# Patient Record
Sex: FEMALE | Race: WHITE | NOT HISPANIC OR LATINO | Employment: OTHER | URBAN - METROPOLITAN AREA
[De-identification: names, ages, dates, MRNs, and addresses within clinical notes are randomized per-mention and may not be internally consistent; named-entity substitution may affect disease eponyms.]

---

## 2017-04-24 ENCOUNTER — GENERIC CONVERSION - ENCOUNTER (OUTPATIENT)
Dept: OTHER | Facility: OTHER | Age: 77
End: 2017-04-24

## 2017-05-30 ENCOUNTER — ALLSCRIPTS OFFICE VISIT (OUTPATIENT)
Dept: OTHER | Facility: OTHER | Age: 77
End: 2017-05-30

## 2017-05-30 DIAGNOSIS — Z12.2 ENCOUNTER FOR SCREENING FOR MALIGNANT NEOPLASM OF RESPIRATORY ORGANS: ICD-10-CM

## 2017-05-30 DIAGNOSIS — Z13.820 ENCOUNTER FOR SCREENING FOR OSTEOPOROSIS: ICD-10-CM

## 2017-05-30 DIAGNOSIS — H91.90 HEARING LOSS: ICD-10-CM

## 2017-06-28 ENCOUNTER — ALLSCRIPTS OFFICE VISIT (OUTPATIENT)
Dept: OTHER | Facility: OTHER | Age: 77
End: 2017-06-28

## 2018-01-09 NOTE — PROGRESS NOTES
Assessment    1  Arthritis (716 90) (M19 90)   2  COPD (chronic obstructive pulmonary disease) (496) (J44 9)   3  Encounter for well adult exam with abnormal findings (V70 0) (Z00 01)    Plan   Arthritis    · Ibuprofen 800 MG Oral Tablet; TAKE 1 TABLET 3 TIMES DAILY WITH FOOD AS  NEEDED   · (1) RHEUMATOID FACTOR SCREEN; Status:Active; Requested for:16Mar2016;    · (1) SED RATE; Status:Active; Requested for:16Mar2016;   Encounter for well adult exam with abnormal findings    · (1) BASIC METABOLIC PROFILE; Status:Active; Requested for:16Mar2016;    · (1) LIPID PANEL, FASTING; Status:Active; Requested for:16Mar2016;     Complete PFT; Status:Hold For - Scheduling; Requested for:16Mar2016;   Perform:[facility]; Due:16Mar2017; Last Updated By:Goldie Fuentes; 3/16/2016 12:55:57 PM;Ordered; For:COPD (chronic obstructive pulmonary disease); Ordered By:Goldie Fuentes;      Discussion/Summary  health maintenance visit Currently, she eats an adequate diet  the patient declines cervical cancer screening Breast cancer screening: the patient declines breast cancer screening  Colorectal cancer screening: the patient declines colorectal cancer screening  Screening lab work includes glucose and lipid profile  Advice and education were given regarding tobacco cessation  #Arthritis- ordered RF, has 1 large joint, 3 small joints involved, >6 weeks, gave RX for ibuprofen 800mg, will follow up in 2-3 weeks, if RA will give referral for rheumatology  #COPD- ordered complete PFT to diagnosis COPD, smoking 1ppd x 40 years will start on steroid inhaler depending on PFTs  # health maintenance- ordered lipid panel and basic metabolic profile, patient refuses colonoscopy and mammogram       Chief Complaint  CPE 63 yo pt has some arthritis issues      History of Present Illness  HPI: 64year old female smoker, who is presenting for pain regarding arthritis > 1 year and states pain in hand joints worsening   She tried taking ibuprofen 200mg BID and increased to 400mg BID but has not helped with pain  She is having trouble doing activities of daily living due to having problems with bending fingers  She also has elbow, wrist and knee pain  She states pain is constant  Review of Systems    Constitutional: no fever and no chills  ENT: no earache  Cardiovascular: no chest pain and no palpitations  Respiratory: no shortness of breath and no wheezing  Gastrointestinal: no abdominal pain, no nausea, no vomiting and no diarrhea  Musculoskeletal: arthralgias, but no myalgias  Family History    · No pertinent family history    Social History    · Current every day smoker (305 1) (F17 200)   · 1 ppd x 40 years   · Does not use illicit drugs (I00 16) (Z78 9)   · No alcohol use    Current Meds   1  Ibuprofen 200 MG Oral Capsule; Therapy: (Recorded:16Mar2016) to Recorded    Allergies    1  No Known Drug Allergies    Vitals   Recorded: 73YKV2364 10:58AM Recorded: 16NGG9989 10:54AM   Temperature  96 7 F   Heart Rate  88   Respiration  20   Systolic 924    Diastolic 72    Height  5 ft 2 in   Weight  93 lb    BMI Calculated  17 01   BSA Calculated  1 38   O2 Saturation  91     Physical Exam    Constitutional   General appearance: No acute distress, well appearing and well nourished  Head and Face   Head and face: Normal     Eyes   Conjunctiva and lids: No swelling, erythema or discharge  Pupils and irises: Equal, round, reactive to light  Ears, Nose, Mouth, and Throat   External inspection of ears and nose: Normal     Otoscopic examination: Tympanic membranes translucent with normal light reflex  Canals patent without erythema  Lips, teeth, and gums: Normal, good dentition  Oropharynx: Normal with no erythema, edema, exudate or lesions  Pulmonary   Respiratory effort: No increased work of breathing or signs of respiratory distress  Auscultation of lungs: Clear to auscultation      Cardiovascular   Auscultation of heart: Normal rate and rhythm, normal S1 and S2, no murmurs  Examination of extremities for edema and/or varicosities: Normal     Abdomen   Abdomen: Non-tender, no masses  Liver and spleen: No hepatomegaly or splenomegaly  Musculoskeletal   Gait and station: Normal     Digits and nails: Abnormal   Examination of the extremities revealed swollen PIP, metacarpal joints, pain to palpation of joints, erythema  Joints, bones, and muscles: Abnormal   Appearance - swelling and erythema  Palpation - tenderness and increased warmth, but no crepitus  Muscle strength/tone: Normal     Neurologic   Sensation: No sensory loss  Psychiatric   Orientation to person, place, and time: Normal     Mood and affect: Normal        Attending Note  Attending Note: Attending Note: I discussed the case with the Resident and reviewed the Resident's note and I agree with the Resident management plan as it was presented to me        Future Appointments    Date/Time Provider Specialty Site   03/30/2016 11:00 AM Ike Sotomayor DO [facility]     Signatures   Electronically signed by : Acacia Fitzpatrick DO; Mar 16 2016  1:24PM EST                       (Author)    Electronically signed by : TOMAS Dave Ace ; Mar 17 2016 10:28AM EST                       (Co-author)

## 2018-01-12 VITALS
RESPIRATION RATE: 18 BRPM | TEMPERATURE: 96 F | WEIGHT: 88 LBS | HEIGHT: 62 IN | BODY MASS INDEX: 16.2 KG/M2 | HEART RATE: 82 BPM | DIASTOLIC BLOOD PRESSURE: 68 MMHG | SYSTOLIC BLOOD PRESSURE: 114 MMHG | OXYGEN SATURATION: 88 %

## 2018-01-13 VITALS
WEIGHT: 84.06 LBS | RESPIRATION RATE: 18 BRPM | OXYGEN SATURATION: 88 % | SYSTOLIC BLOOD PRESSURE: 116 MMHG | BODY MASS INDEX: 16.5 KG/M2 | HEIGHT: 60 IN | DIASTOLIC BLOOD PRESSURE: 54 MMHG | HEART RATE: 81 BPM

## 2018-01-13 NOTE — MISCELLANEOUS
Message   Recorded as Task   Date: 04/21/2017 04:27 PM, Created By: Rema Salguero   Task Name: Med Renewal Request   Assigned To: Oleg Ellis   Regarding Patient: Harman Georges, Status: Active   CommentClydie J Carlos - 21 Apr 2017 4:27 PM     TASK CREATED  Caller: Self; Renew Medication; [phone] (Home)  DR Miquel Cee - PT WOULD LIKE TO SPEAK TO YOU ABOUT MED REFILL  PT HAS APPT WITH YOU ON TUES 4/25/17, SHE DOESN'T WANT TO COME IN FOR HER VISIT  SHE JUST WANTS YOU TO REFILL HER PRESCRIPTION  DON'T QUITE KNOW WHAT SHE NEEDS  SHE WOULD LIKE TO SPEAK TO YOU BEFORE HER APPT     Dinh Joseph - 21 Apr 2017 4:47 PM     TASK REPLIED TO: Previously Autumn Clarke, this patient wants to speak to you specifically   Message Free Text Note Form: left message on machine to call      Signatures   Electronically signed by : TOMAS Romo ; Apr 24 2017 12:37PM EST                       (Author)

## 2018-01-13 NOTE — PROGRESS NOTES
Assessment    1  Screening for osteoporosis (V82 81) (Z13 820)   2  Screening for neurological condition (V80 09) (Z13 89)   3  Refused influenza vaccine (V64 06) (Z28 21)   4  COPD (chronic obstructive pulmonary disease) (496) (J44 9)   5  Shortness of breath on exertion (786 05) (R06 02)   6  Bilateral lower extremity edema (782 3) (R60 0)   7  Hearing loss (389 9) (H91 90)   8  Encounter for preventive health examination (V70 0) (Z00 00)    Plan  Bilateral lower extremity edema, Shortness of breath on exertion    · ECG WITH RHYTHM STRIP; Status:Hold For - Scheduling; Requested HYU:[id number];    · ECHO COMPLETE WITH CONTRAST IF INDICATED; Status:Need Information -  Financial Authorization; Requested TRL:[id number];   COPD (chronic obstructive pulmonary disease)    · Ipratropium-Albuterol 0 5-2 5 (3) MG/3ML Inhalation Solution; USE 1 UNIT DOSE  IN NEBULIZER 4 TIMES DAILY   · SPIROMETRY W/O BRONCHO- POC; Status:Active - Perform Order; Requested  DWP:[id number]; Hearing loss    · *1 -  AUDIOLOGY [facility]) Co-Management  *  Status: Active  Requested for:  28HNH7478  Care Summary provided  : Yes  Refused influenza vaccine    · Stop: Influenza  Screening for neurological condition    · *VB - Fall Risk Assessment  (Dx Z13 89 Screen for Neurologic Disorder);  Status:Complete;   Done: 65IRE9742 05:30PM   · *VB - Urinary Incontinence Screen (Dx Z13 89 Screen for UI); Status:Complete;   Done:  06LZP7037 05:30PM  Screening for osteoporosis    · * DXA BONE DENSITY SPINE HIP AND PELVIS; Status:Hold For - Scheduling;  Requested FBU:[id number];     Discussion/Summary    Amy Camacho is a 75 yo F who presents today with her daughter for initial Medicare wellness visit with some complaints including:  # SOB on Exertion, Bilateral Lower Extremity Edema: Gave script for EKG and echo  Advised leg elevation and compression stockings     # COPD: Started pt on Ipratropium-Albuterol Neb due to inability for her to use Spiriva/Ventolin 2/2 arthritis in hands  # Hearing Loss: Audiology referral given  Impression: Welcome to Medicare Visit, with preventive exam as well as age and risk appropriate counseling completed  Cardiovascular screening and counseling: the risks and benefits of screening were discussed, counseling was given on maintaining a healthy diet, counseling was given on maintaining a healthy weight, counseling was given on tobacco cessation and Script given for EKG  Colorectal cancer screening and counseling: the patient declines screening  Breast cancer screening and counseling: the patient declines screening  Cervical cancer screening and counseling: screening not indicated  Osteoporosis screening and counseling: the risks and benefits of screening were discussed, counseling was given on obtaining adequate amounts of calcium and vitamin D on a daily basis, counseling was given on the importance of regular weightbearing exercise and Script given for DXA scan  Glaucoma screening and counseling: screening is current and Pt sees eye doctor separately  HIV screening and counseling: screening not indicated  Immunizations: the patient declines the influenza vaccination, the patient declines the pneumococcal vaccination, hepatitis B vaccination series is not indicated at this time due to the patient's low risk of dayton the disease, the patient declines the Zostavax vaccine, the patient declines the Td vaccine and the patient declines the Tdap vaccine  Tobacco Cessation: an intermediate session is done today  Advance Directive Planning: complete and up to date, Pt has advanced directive complete, but we currently do not have record  Let her know she has to send copy  She was referred to audiology  Patient Discussion: plan discussed with the patient, plan discussed with the patient's family        Chief Complaint  ekg unobtainable, patient has poor circulation in left leg machine not picking up lead , patient has had charley horses in that leg often      History of Present Illness  HPI: Bradford Curiel is a 77 yo F who presents today with her daughter for initial Medicare wellness visit with some complaints including:  # Arthritis: Pt states that she went to see a new rheumatologist today (Dr Rubio Maloney in [facility]) who ordered tests including blood work and XR hands, wrists, ankles and feet  No records available at this time  # SOB on Exertion, Bilateral Lower Extremity Edema: States this has been going on for the past year, some days worse than others  States she is on her feet a lot  Has not tried leg elevation or compression stockings  # COPD: Previously used Ventolin/Spiriva inhalers, but due to arthritis in her hands, pt states that she has trouble using those at this time and would like neb instead which is easier for her  # Hearing Loss: Pt's hearing has been decreasing for the past few years as she ages  States she tried getting a hearing aid a while ago but could not afford it so gave up  Welcome to [employer] and Wellness Visits: The patient is being seen for the initial annual wellness visit  Medicare Screening and Risk Factors   Hospitalizations: she has been previously hospitalizied, she has been hospitalized 6 times and Tonsillectomy, 4 births, pneumonia  Once per lifetime medicare screening tests: EKG unable to be performed  Medicare Screening Tests Risk Questions   Osteoporosis risk assessment: , female gender, over 48years of age, the patient's weight is too low (<127 lbs) and tobacco use  HIV risk assessment: none indicated  Drug and Alcohol Use: The patient currently smokes 1 packs per day  She has smoked for 50 year(s)  She is not ready to quit using tobacco and Thinking about quitting, has all of the resources  The patient reports never drinking alcohol  She has never used illicit drugs     Diet and Physical Activity: Current diet includes well balanced meals, limited junk food, 1 servings of fruit per day, 1 servings of vegetables per day, 0-1 servings of meat per day, 1 servings of dairy products per day and 2 cups of coffee per day  She is sedentary  Functional Ability/Level of Safety: Hearing is significantly decreased  Advance Directives: Advance directives: living will, durable power of  for health care directives and advance directives  end of life decisions were reviewed with the patient and I agree with the patient's decisions  Concerns with the patient's end of life decisions: Family decides  Co-Managers and Medical Equipment/Suppliers: See Patient Care Team   Preventive Quality Program 65 and Older: Falls Risk: The patient fell 0 times in the past 12 months  Symptoms Include: no confusion, no lightheadedness, no dizziness, no syncope, no impaired balance, no visual problems, no leg weakness, no recurring falls and no recent fall  Associated symptoms:  no impaired mobility    no impaired ability to live independently  Current treatment includes use of a walker, use of a cane and nonsteroidal anti-inflammatory drugs  Pertinent medical history includes:  hearing impairment, but no transient ischemic attacks, no stroke, no dementia, no cardiac arrhythmia, no vertigo, no syncope, no pacemaker, no Parkinson's disease, no gait disturbance, no normal pressure hydrocephalus, no peripheral neuropathy and no recent lower extremity injury  Risk factors include:  stairs at home, but no poor lighting, no loose rugs, no tripping hazards, no improper footwear, no alcohol abuse, no multiple medications, no cognitive impairment, no impaired mobility and no elder abuse  She was previously evaluated in this clinic  Past treatment has included use of a walker, use of a cane, a falls prevention checklist and nonsteroidal anti-inflammatory drugs   Urinary Incontinence Symptoms includes: urinary frequency        Patient Care Team    Care Team Member Role Specialty Office Number   [place] Paresh MATUTE Resident Family Medicine [phone]     Review of Systems    Constitutional: chills, but no fever, no malaise and no fatigue  Head and Face: no facial pain and no facial pressure  Eyes: no eye pain, eyes not red, no watery discharge from the eyes, no purulent discharge from the eyes, no itching of the eyes and no blurred vision  ENT: negative  Cardiovascular: lower extremity edema, but no chest pain, no palpitations, the heart is not racing and no lightheadedness  Respiratory: shortness of breath, cough and productive cough, but no wheezing, no dry cough and not vomiting blood  Gastrointestinal: no abdominal pain, no abdominal bloating, no abdominal cramps, no menstrual pain, no nausea, no vomiting, no diarrhea, able to pass flatus, no constipation, no bright red blood per rectum and no melena  Genitourinary: urinary frequency  Musculoskeletal: diffuse joint pain, but no joint swelling and no joint stiffness  Integumentary and Breasts: negative  Neurological: negative  Psychiatric: no depression and not suicidal        Yes, the patient is satisfied with Her life  Yes, the patient has dropped many of their activities and interests  Point = 1  No, the patient does not feel that their life is empty  No, the patient does not often get bored  Yes, the patient is hopeful about the future  No, the patient is not bothered by thoughts in their head  No, the patient is not in good spirits most of the time  Point = 1  No, the patient is not afraid something bad is going to happen to them  No, the patient does not feel happy most of time  Point = 1  Yes, the patient often feels helpless  Point = 1  No, the patient jaeger not often get restless and fidgety  Yes, the patient prefers to stay home rather then try new things  Point = 1  No, the patient does not worry about the future     No, the patient does not feel that they have more problems with their memory than most    Yes, the patient thinks its wonderful to be alive now  No, the patient does not feel downhearted or blue  No, the patient does not feel worthless the way they are currently  No, the patient does not find life to be very exciting  Point = 1  No, the patient does not worry a lot about the past    No, it is not hard for the patient to get started on new projects  No, the patient does not feel full of energy  Point = 1  No, the patient does not feel their situation is hopeless  No, the patient does not feel that most people are better off then they are  No, the patient does not frequently get upset about the little things  Yes, the patient frequently feels like crying  Point = 1  No, the patient does not have any problems concentrating  Yes, the patient enjoys getting up in the morning  No, the patient enjoys social gatherings  No, the patient finds its hard to make decisions  Point = 1  Yes, the patient feels their mind is as clear as it used to be  Normal 0 - 9, Mild Depression 10 - 19, Severe Depression 20 - 30      Active Problems    1  Adult BMI <19 kg/sq m (V85 0) (Z68 1)   2  Arthritis (716 90) (M19 90)   3  Bilateral carpal tunnel syndrome (354 0) (G56 03)   4  COPD (chronic obstructive pulmonary disease) (496) (J44 9)   5  Encounter for screening for lung cancer (V76 0) (Z12 2)   6  Ganglion of right wrist (727 41) (M67 431)   7  Other bursal cyst, right elbow (727 49) (M71 321)   8  Rheumatoid arthritis (714 0) (M06 9)   9   Screening for osteoporosis (V82 81) (Z13 820)    Past Medical History    · History of Encounter for screening for cardiovascular disorders (V81 2) (Z13 6)   · History of Encounter for well adult exam with abnormal findings (V70 0) (Z00 01)    Surgical History    · History of Tonsillectomy    Family History  Mother    · No pertinent family history  Father    · Family history of myocardial infarction (V17 3) (Z80 55)  Daughter    · Family history of Mitral valve prolapse  Son    · Family history of myocardial infarction (V17 3) (Z82 49)    Social History    · Current every day smoker (305 1) (F17 200)   · 1 ppd x 40 years   · Does not use illicit drugs (R78 79) (Z78 9)   · No alcohol use    Current Meds   1  Spiriva HandiHaler 18 MCG Inhalation Capsule; inhale the contents of one capsule in the   handihaler once daily; Therapy: 59RJF1445 to (Evaluate:62Qli4967)  Requested for: 37IFR9792; Last   Rx:30Mar2016 Ordered   2  Ventolin  (90 Base) MCG/ACT Inhalation Aerosol Solution; INHALE 2 PUFFS   EVERY 4 HOURS AS NEEDED; Therapy: 26BBF3195 to (Last Rx:30Mar2016)  Requested for: 10VHG5177 Ordered    Allergies    1  No Known Drug Allergies    Vitals  Signs    Heart Rate: 81  Respiration: 18  Systolic: 988  Diastolic: 54  Height: 5 ft   Weight: 84 lb 1 oz  BMI Calculated: 16 42  BSA Calculated: 1 29  O2 Saturation: 88    Physical Exam    Constitutional   General appearance: No acute distress, well appearing and well nourished  Head and Face   Head and face: Normal     Palpation of the face and sinuses: No sinus tenderness  Eyes   Conjunctiva and lids: No swelling, erythema or discharge  Pupils and irises: Equal, round, reactive to light  Ophthalmoscopic examination: Normal fundi and optic discs  Ears, Nose, Mouth, and Throat   External inspection of ears and nose: Normal     Otoscopic examination: Tympanic membranes translucent with normal light reflex  Canals patent without erythema  Hearing: Abnormal     Nasal mucosa, septum, and turbinates: Normal without edema or erythema  Oropharynx: Normal with no erythema, edema, exudate or lesions  Neck   Neck: Supple, symmetric, trachea midline, no masses  Thyroid: Normal, no thyromegaly  Pulmonary   Respiratory effort: No increased work of breathing or signs of respiratory distress  Palpation of chest: Normal     Auscultation of lungs: Clear to auscultation  Cardiovascular   Palpation of heart: Normal PMI, no thrills  Auscultation of heart: Normal rate and rhythm, normal S1 and S2, no murmurs  Pedal pulses: 2+ bilaterally  Examination of extremities for edema and/or varicosities: Abnormal   bilateral ankle pitting edema and bilateral pretibial pitting edema  Abdomen   Abdomen: Non-tender, no masses  Liver and spleen: No hepatomegaly or splenomegaly  Examination for hernias: No hernia appreciated  Musculoskeletal   Gait and station: Normal     Digits and nails: Normal without clubbing or cyanosis  Joints, bones, and muscles: Abnormal   mild swelling in PIP, MCP joints bilaterally  2 round, soft, mobile cysts on dorsal surface of R wrist and R elbow  Range of motion: Normal     Stability: Normal     Muscle strength/tone: Normal        Results/Data  *VB - Fall Risk Assessment  (Dx Z13 89 Screen for Neurologic Disorder) 28Jun2017 05:30PM [employer]     Test Name Result Flag Reference   Falls Risk      No falls in the past year     *VB - Urinary Incontinence Screen (Dx Z13 89 Screen for UI) 28Jun2017 05:30PM [employer]     Test Name Result Flag Reference   Urinary Incontinence Assessment 28Jun2017         Procedure    Procedure: Visual Acuity Test    Indication: routine screening  Inforrmation supplied by a Snellen chart  Results: 20/40 in both eyes with corrective device normal in both eyes  Attending Note  Attending Note [address] [place] [address] [address]: Attending Note: I did not interview and examine the patient, I supervised the Resident and I agree with the Resident management plan as it was presented to me  Level of Participation: I was present in clinic, but did not examine the patient  Patient's History: 76 y o  female here for Medicare wellness visit  Key Parts of the Exam: As per resident's note  Diagnosis and Plan: Health Maintenance: stable; plan as above, including DXA scan    COPD- stable; pt  having trouble actuating inhalers due to arthritis; Rx nebs as noted above  I agree with the Resident's note  Signatures   Electronically signed by : Yessenia Varghese MD; Jun 29 2017  8:49AM EST                       (Author)    Electronically signed by :  TOMAS Sparrow ; Jun 29 2017 10:49AM EST                       (Co-author)

## 2018-01-15 NOTE — MISCELLANEOUS
Message   Recorded as Task   Date: 04/21/2017 04:27 PM, Created By: Monet Alvarez   Task Name: Med Renewal Request   Assigned To: Oleg Ellis   Regarding Patient: Rosales Fagan, Status: Active   CommentRomie Juan - 21 Apr 2017 4:27 PM     TASK CREATED  Caller: Self; Renew Medication; [phone] (Home)  DR Klaus Gastelum - PT WOULD LIKE TO SPEAK TO YOU ABOUT MED REFILL  PT HAS APPT WITH YOU ON TUES 4/25/17, SHE DOESN'T WANT TO COME IN FOR HER VISIT  SHE JUST WANTS YOU TO REFILL HER PRESCRIPTION  DON'T QUITE KNOW WHAT SHE NEEDS  SHE WOULD LIKE TO SPEAK TO YOU BEFORE HER APPT  Dinh Joseph - 21 Apr 2017 4:47 PM     TASK REPLIED TO: Previously Assigned To [facility] Dr Ronn Bristle, this patient wants to speak to you specifically   Oleg Ellis - 24 Apr 2017 12:37 PM     TASK COMPLETED   Monet Alvarez - 24 Apr 2017 1:06 PM     TASK REACTIVATED  DR Klaus Gastelum - PT IS RETURNING YOUR CALL  SHE SAID YOU CAN CALL HER ANY TIME THIS AFTERNOON  Message Free Text Note Form: Patient wishes to renew Mobic and reschedule appointment due to car problems  We'll reschedule for one to 2 months from now        Signatures   Electronically signed by : TOMAS Lopez ; Apr 24 2017  2:56PM EST                       (Author)

## 2018-06-22 ENCOUNTER — OFFICE VISIT (OUTPATIENT)
Dept: FAMILY MEDICINE CLINIC | Facility: CLINIC | Age: 78
End: 2018-06-22
Payer: MEDICARE

## 2018-06-22 VITALS
OXYGEN SATURATION: 86 % | TEMPERATURE: 97.4 F | DIASTOLIC BLOOD PRESSURE: 80 MMHG | BODY MASS INDEX: 18.55 KG/M2 | WEIGHT: 95 LBS | SYSTOLIC BLOOD PRESSURE: 140 MMHG | HEART RATE: 116 BPM

## 2018-06-22 DIAGNOSIS — R19.7 DIARRHEA, UNSPECIFIED TYPE: Primary | ICD-10-CM

## 2018-06-22 PROBLEM — R60.0 BILATERAL LOWER EXTREMITY EDEMA: Status: ACTIVE | Noted: 2017-06-28

## 2018-06-22 PROBLEM — H91.90 HEARING LOSS: Status: ACTIVE | Noted: 2017-06-28

## 2018-06-22 PROBLEM — M06.9 RHEUMATOID ARTHRITIS (HCC): Status: ACTIVE | Noted: 2017-05-30

## 2018-06-22 PROBLEM — G56.03 BILATERAL CARPAL TUNNEL SYNDROME: Status: ACTIVE | Noted: 2017-05-30

## 2018-06-22 PROCEDURE — 99213 OFFICE O/P EST LOW 20 MIN: CPT | Performed by: FAMILY MEDICINE

## 2018-06-22 RX ORDER — LEFLUNOMIDE 20 MG/1
20 TABLET ORAL DAILY
Refills: 0 | COMMUNITY
Start: 2018-06-20

## 2018-06-22 RX ORDER — IPRATROPIUM BROMIDE AND ALBUTEROL SULFATE 2.5; .5 MG/3ML; MG/3ML
1 SOLUTION RESPIRATORY (INHALATION) 4 TIMES DAILY
COMMUNITY
Start: 2017-06-28 | End: 2018-07-03

## 2018-06-22 RX ORDER — ALBUTEROL SULFATE 90 UG/1
2 AEROSOL, METERED RESPIRATORY (INHALATION) EVERY 4 HOURS PRN
COMMUNITY
Start: 2016-03-30 | End: 2018-07-03

## 2018-06-22 RX ORDER — NEBULIZER ACCESSORIES
KIT MISCELLANEOUS
COMMUNITY
Start: 2017-06-30 | End: 2018-07-03

## 2018-06-22 NOTE — PATIENT INSTRUCTIONS
Acute Diarrhea   WHAT YOU NEED TO KNOW:   Acute diarrhea starts quickly and lasts a short time, usually 1 to 3 days  It can last up to 2 weeks  You may not be able to control your diarrhea  Acute diarrhea usually stops on its own  DISCHARGE INSTRUCTIONS:   Return to the emergency department if:   · You feel confused  · Your heartbeat is faster than normal      · Your eyes look deeply sunken, or you have no tears when you cry  · You urinate less than usual, or your urine is dark yellow  · You have blood or mucus in your stools  · You have severe abdominal pain  · You are unable to drink any liquids  Contact your healthcare provider if:   · Your symptoms do not get better with treatment  · You have a fever higher than 101 3°F (38 5°C)  · You have trouble eating and drinking because you are vomiting  · You are thirsty or have a dry mouth  · Your diarrhea does not get better in 7 days  · You have questions or concerns about your condition or care  Follow up with your healthcare provider as directed:  Write down your questions so you remember to ask them during your visits  Medicines:  · Diarrhea medicine  is an over-the-counter medicine that helps slow or stop your diarrhea  If you take other medicines, talk to your healthcare provider before you take diarrhea medicine  · Antibiotics  may be given to help treat an infection caused by bacteria  · Antiparasitics  may be given to treat an infection caused by parasites  · Take your medicine as directed  Contact your healthcare provider if you think your medicine is not helping or if you have side effects  Tell him of her if you are allergic to any medicine  Keep a list of the medicines, vitamins, and herbs you take  Include the amounts, and when and why you take them  Bring the list or the pill bottles to follow-up visits  Carry your medicine list with you in case of an emergency    Self-care:   · Drink liquids as directed  Liquids will help prevent dehydration caused by diarrhea  Ask your healthcare provider how much liquid to drink each day and which liquids are best for you  You may need to drink an oral rehydration solution (ORS)  An ORS has the right amounts of water, salts, and sugar you need to replace body fluids  You can buy an ORS at most grocery stores and pharmacies  · Eat foods that are easy to digest   Examples include rice, lentils, cereal, bananas, potatoes, and bread  It also includes some fruits (bananas, melon), well-cooked vegetables, and lean meats  Avoid foods high in fiber, fat, and sugar  Also avoid caffeine, alcohol, dairy, and red meat until your diarrhea is gone  Prevent acute diarrhea:   · Wash your hands often  Use soap and water  Wash your hands before you eat or prepare food  Also wash your hands after you use the bathroom  Use an alcohol-based hand gel when soap and water are not available  · Keep bathroom surfaces clean  This helps prevent the spread of germs that cause acute diarrhea  · Wash fruits and vegetables well before you eat them  This can help remove germs that cause diarrhea  If possible, remove the skin from fruits and vegetables, or cook them well before you eat them  · Cook meat as directed  ¨ Cook ground meat  to 160°F      ¨ Cook ground poultry, whole poultry, or cuts of poultry  to at least 165°F  Remove the meat from heat  Let it stand for 3 minutes before you eat it  ¨ Cook whole cuts of meat other than poultry  to at least 145°F  Remove the meat from heat  Let it stand for 3 minutes before you eat it  · Wash dishes that have touched raw meat with hot water and soap  This includes cutting boards, utensils, dishes, and serving containers  · Place raw or cooked meat in the refrigerator as soon as possible  Bacteria can grow in meat that is left at room temperature too long  · Do not eat raw or undercooked oysters, clams, or mussels  These foods may be contaminated and cause infection  · Drink filtered or treated water only when you travel  Do not put ice in your drinks  Drink bottled water whenever possible  © 2017 [address] [employer] Information is for End User's use only and may not be sold, redistributed or otherwise used for commercial purposes  All illustrations and images included in CareNotes® are the copyrighted property of [employer]  or Darrick Harkins  The above information is an  only  It is not intended as medical advice for individual conditions or treatments  Talk to your doctor, nurse or pharmacist before following any medical regimen to see if it is safe and effective for you

## 2018-06-22 NOTE — PROGRESS NOTES
Assessment/Plan:    No problem-specific Assessment & Plan notes found for this encounter  Diagnoses and all orders for this visit:    Diarrhea, unspecified type    Other orders  -     albuterol (VENTOLIN HFA) 90 mcg/act inhaler; Inhale 2 puffs every 4 (four) hours as needed  -     tiotropium (SPIRIVA HANDIHALER) 18 mcg inhalation capsule; Place 1 capsule into inhaler and inhale daily  -     Respiratory Therapy Supplies (NEBULIZER/TUBING/MOUTHPIECE) KIT; by Does not apply route  -     Respiratory Therapy Supplies (NEBULIZER COMPRESSOR) KIT; by Does not apply route  -     ipratropium-albuterol (DUO-NEB) 0 5-2 5 mg/3 mL nebulizer solution; Inhale 1 each 4 (four) times a day  -     leflunomide (ARAVA) 20 MG tablet; Take 20 mg by mouth daily          # diarrhea  -likely viral as patient reports she has been noting some semi formed and mucoid like stools  I discussed with patient this is likely improving  I discussed with patient to avoid dairy products for the next 2 to 3 days  Counseled the patient to eat more carbohydrates which consist of breads, pasta, oatmeal, cereals  Patient verbalized understanding  I also discussed with patient that she may use over-the-counter Metamucil for increased fiber intake  Patient agreed  Also discussed with patient to drink Gatorade/Powerade/Pedialyte of at least 32 ounces per day for the next 2 to 3 days  Patient verbalized understanding    At this time I do not feel that the patient needs any stool studies or any blood work as a it is less than two weeks, no history of travel and patient has remained afebrile   -encourage hydration  -counseled patient on  hygiene  -return precautions given     # RTO PRN    Subjective:      Patient ID: Eliu Lee is a [de-identified] y o  female with past medical history of COPD on 2L oxygen at night, bilateral carpal tunnel, hearing loss, arthritis, rheumatoid arthritis with his son presents today with complaints of loose and watery diarrhea few times a day which has been gong on for about a week  Patient has been on prednisone chronically which is being weaned  Patient hasn't been on antibiotics and antifungals recently  Patient reports incontinence each time she drinks  Patient denies fever, chills, abdominal pain, N/V, URI -like symptoms  Patient denies history of ill contacts  Patient denies recent travel  Patient denies consumption of raw oysters or seafood  Patient denies blood in the stool or urine  No history of trauma/history or MVA  Denies dysuria  No new medications  No skin rashes  Patient reports a Tmax of 100 a few days ago  Patient has been tolerating oral intake  HPI    The following portions of the patient's history were reviewed and updated as appropriate:   She  has no past medical history on file  She   Patient Active Problem List    Diagnosis Date Noted    Diarrhea 06/22/2018    Bilateral lower extremity edema 06/28/2017    Hearing loss 06/28/2017    Bilateral carpal tunnel syndrome 05/30/2017    Rheumatoid arthritis ([facility] ) 05/30/2017    Ganglion of right wrist 03/30/2016    Arthritis 03/16/2016    COPD (chronic obstructive pulmonary disease) ([facility] ) 03/16/2016     She  has a past surgical history that includes Tonsillectomy  Her family history includes Heart attack in her father and son; Mitral valve prolapse in her daughter; No Known Problems in her mother  She  reports that she has been smoking  She does not have any smokeless tobacco history on file  She reports that she does not drink alcohol or use drugs    Current Outpatient Prescriptions   Medication Sig Dispense Refill    albuterol (VENTOLIN HFA) 90 mcg/act inhaler Inhale 2 puffs every 4 (four) hours as needed      ipratropium-albuterol (DUO-NEB) 0 5-2 5 mg/3 mL nebulizer solution Inhale 1 each 4 (four) times a day      leflunomide (ARAVA) 20 MG tablet Take 20 mg by mouth daily  0    Respiratory Therapy Supplies (NEBULIZER COMPRESSOR) KIT by Does not apply route      Respiratory Therapy Supplies (NEBULIZER/TUBING/MOUTHPIECE) KIT by Does not apply route      tiotropium (SPIRIVA HANDIHALER) 18 mcg inhalation capsule Place 1 capsule into inhaler and inhale daily       No current facility-administered medications for this visit  No current outpatient prescriptions on file prior to visit  No current facility-administered medications on file prior to visit  She has No Known Allergies       Review of Systems   Constitutional: Negative for activity change, appetite change, chills and fever  HENT: Negative for ear discharge, ear pain, rhinorrhea, sinus pain, sinus pressure and sneezing  Respiratory: Positive for shortness of breath (on 2L home oxygen at night)  Gastrointestinal: Positive for diarrhea (loose, watery)  Negative for abdominal pain, blood in stool and vomiting  Genitourinary: Negative for dysuria, hematuria and urgency  Musculoskeletal: Negative for back pain, gait problem and neck pain  Skin: Negative for pallor and rash  Neurological: Negative for dizziness, weakness, light-headedness, numbness and headaches  Psychiatric/Behavioral: Negative for behavioral problems, self-injury and suicidal ideas  The patient is not nervous/anxious and is not hyperactive  Objective:      /80 (BP Location: Right arm, Patient Position: Sitting, Cuff Size: Standard)   Pulse (!) 116   Temp (!) 97 4 °F (36 3 °C) (Tympanic)   Wt 43 1 kg (95 lb)   SpO2 (!) 86%   BMI 18 55 kg/m²          Physical Exam   Constitutional: She appears cachectic  She appears ill (chronic)  HENT:   Head: Normocephalic and atraumatic  Eyes: Right eye exhibits no discharge  Left eye exhibits no discharge  Wears glasses     Neck: Neck supple  Cardiovascular: S1 normal and S2 normal   Tachycardia present  Exam reveals no gallop  No murmur heard  Pulmonary/Chest: Effort normal and breath sounds normal  No respiratory distress  She has no wheezes  She has no rales  Abdominal: Soft  Bowel sounds are normal  She exhibits no distension  There is no tenderness  There is no rebound  No suprapubic tenderness, no CVA tenderness   Neurological: She is alert  Psychiatric: She has a normal mood and affect  Her behavior is normal  Judgment and thought content normal    Nursing note and vitals reviewed          Shahnaz Hooper

## 2018-07-03 ENCOUNTER — OFFICE VISIT (OUTPATIENT)
Dept: FAMILY MEDICINE CLINIC | Facility: CLINIC | Age: 78
End: 2018-07-03
Payer: MEDICARE

## 2018-07-03 VITALS
HEART RATE: 108 BPM | DIASTOLIC BLOOD PRESSURE: 80 MMHG | OXYGEN SATURATION: 89 % | TEMPERATURE: 97.3 F | BODY MASS INDEX: 17.11 KG/M2 | HEIGHT: 62 IN | SYSTOLIC BLOOD PRESSURE: 140 MMHG | WEIGHT: 93 LBS

## 2018-07-03 DIAGNOSIS — R19.7 DIARRHEA, UNSPECIFIED TYPE: Primary | ICD-10-CM

## 2018-07-03 DIAGNOSIS — Z12.11 COLON CANCER SCREENING: ICD-10-CM

## 2018-07-03 DIAGNOSIS — Z12.11 ENCOUNTER FOR FIT (FECAL IMMUNOCHEMICAL TEST) SCREENING: ICD-10-CM

## 2018-07-03 DIAGNOSIS — R19.5 MUCUS IN STOOL: ICD-10-CM

## 2018-07-03 PROCEDURE — 99213 OFFICE O/P EST LOW 20 MIN: CPT | Performed by: NURSE PRACTITIONER

## 2018-07-03 RX ORDER — PREDNISONE 20 MG/1
TABLET ORAL DAILY
COMMUNITY

## 2018-07-03 NOTE — PROGRESS NOTES
Alize Lucas is a [de-identified] y o  female who presents for evaluation of diarrhea  Her daughter is accompanying her during visit  Onset of diarrhea was 3 weeks ago  Diarrhea is occurring approximately up to 6 times per day  Patient describes diarrhea as mucous-laden and semisolid  Diarrhea has been associated with fever to 100  Patient denies blood in stool, illness in household contacts, recent antibiotic use, recent camping, recent travel, significant abdominal pain  Previous visits for diarrhea: yes, last seen 2 weeks ago by medical resident at [facility]   Evaluation to date: none  Treatment to date: bland diet, watchful waiting  Was started on [address] for RA by Dr Seo 3 months ago  The following portions of the patient's history were reviewed and updated as appropriate: allergies, current medications, past family history, past medical history, past social history, past surgical history and problem list     Review of Systems  Pertinent items are noted in HPI       Objective     /80 (BP Location: Left arm, Patient Position: Sitting, Cuff Size: Standard)   Pulse (!) 108   Temp (!) 97 3 °F (36 3 °C) (Tympanic)   Ht 5' 2" (1 575 m)   Wt 42 2 kg (93 lb)   SpO2 (!) 89%   BMI 17 01 kg/m²   General: alert and oriented, in no acute distress and cachectic chronically ill appearing   Hydration:  well hydrated   Abdomen:    soft, non-tender; bowel sounds normal; no masses,  no organomegaly     Patient usually wears 02  Her portable tank is broken  Sp02 91% while sitting during ov  Lungs decreased t/o      Assessment/Plan     Diarrhea of uncertain etiology, moderate in severity  Appropriate educational material discussed and distributed  Discussed the appropriate management of diarrhea  Stool studies per orders  Consider Arava as possible culpril of diarrhea if stool testing negative  Patient is requesting conservative management   She refuses imaging, aggressive management of her chronic conditions (COPD, RA, anemia, etc)  Her wish is to be comfortable  There is a transportation hardship  Patient no longer drives  Her children live far away in [place]  I provided her with info for [employer] (transportation, meals, assistance)   She will bring her living will to next appt

## 2018-07-03 NOTE — PATIENT INSTRUCTIONS
Acute Diarrhea   AMBULATORY CARE:   Acute diarrhea  starts quickly and lasts a short time, usually 1 to 3 days  It can last up to 2 weeks  Signs and symptoms that may happen with diarrhea:  You may have 3 or more episodes of diarrhea  It may be hard to control your diarrhea  You may also have any of the following:  · Fever and chills    · Headache or abdominal pain    · Nausea and vomiting    · Symptoms of dehydration such as thirst, decreased urination, dry skin, sunken eyes, or fast, pounding heartbeat  Seek care immediately if:   · You feel confused  · Your heartbeat is faster than normal      · Your eyes look deeply sunken, or you have no tears when you cry  · You urinate less than usual, or your urine is dark yellow  · You have blood or mucus in your stools  · You have severe abdominal pain  · You are unable to drink any liquids  Contact your healthcare provider if:  · Your symptoms do not get better with treatment  · You have a fever higher than 101 3°F (38 5°C)  · You have trouble eating and drinking because you are vomiting  · You are thirsty or have a dry mouth  · Your diarrhea does not get better in 7 days  · You have questions or concerns about your condition or care  Treatment for acute diarrhea  may include medicines to slow or stop your diarrhea  You may also need medicine to treat an infection  Self-care:   · Drink liquids as directed  Liquids will help prevent dehydration caused by diarrhea  Ask your healthcare provider how much liquid to drink each day and which liquids are best for you  You may need to drink an oral rehydration solution (ORS)  An ORS has the right amounts of water, salts, and sugar you need to replace body fluids  You can buy an ORS at most grocery stores and pharmacies  · Eat foods that are easy to digest   Examples include rice, lentils, cereal, bananas, potatoes, and bread   It also includes some fruits (bananas, melon), well-cooked vegetables, and lean meats  Avoid foods high in fiber, fat, and sugar  Also avoid caffeine, alcohol, dairy, and red meat until your diarrhea is gone  Prevent diarrhea:   · Wash your hands often  Use soap and water  Wash your hands before you eat or prepare food  Also wash your hands after you use the bathroom  Use an alcohol-based hand gel when soap and water are not available  · Keep bathroom surfaces clean  This helps prevent the spread of germs that cause acute diarrhea  · Wash fruits and vegetables well before you eat them  This can help remove germs that cause diarrhea  If possible, remove the skin from fruits and vegetables, or cook them well before you eat them  · Cook meat as directed  ¨ Cook ground meat  to 160°F      ¨ Cook ground poultry, whole poultry, or cuts of poultry  to at least 165°F  Remove the meat from heat  Let it stand for 3 minutes before you eat it  ¨ Cook whole cuts of meat other than poultry  to at least 145°F  Remove the meat from heat  Let it stand for 3 minutes before you eat it  · Wash dishes that have touched raw meat with hot water and soap  This includes cutting boards, utensils, dishes, and serving containers  · Place raw or cooked meat in the refrigerator as soon as possible  Bacteria can grow in meat that is left at room temperature too long  · Do not eat raw or undercooked oysters, clams, or mussels  These foods may be contaminated and cause infection  · Drink filtered or treated water only when you travel  Do not put ice in your drinks  Drink bottled water whenever possible  Follow up with your healthcare provider as directed:  Write down your questions so you remember to ask them during your visits  © 2017 [address] [employer] [employer] Information is for End User's use only and may not be sold, redistributed or otherwise used for commercial purposes   All illustrations and images included in CareNotes® are the copyrighted property of [employer]  or Darrick Harkins  The above information is an  only  It is not intended as medical advice for individual conditions or treatments  Talk to your doctor, nurse or pharmacist before following any medical regimen to see if it is safe and effective for you

## 2018-07-05 PROCEDURE — 87209 SMEAR COMPLEX STAIN: CPT | Performed by: NURSE PRACTITIONER

## 2018-07-05 PROCEDURE — 87505 NFCT AGENT DETECTION GI: CPT | Performed by: NURSE PRACTITIONER

## 2018-07-05 PROCEDURE — 87493 C DIFF AMPLIFIED PROBE: CPT | Performed by: NURSE PRACTITIONER

## 2018-07-05 PROCEDURE — 87177 OVA AND PARASITES SMEARS: CPT | Performed by: NURSE PRACTITIONER

## 2018-07-06 LAB
C DIFF TOX GENS STL QL NAA+PROBE: NORMAL
CAMPYLOBACTER DNA SPEC NAA+PROBE: NORMAL
SALMONELLA DNA SPEC QL NAA+PROBE: NORMAL
SHIGA TOXIN STX GENE SPEC NAA+PROBE: NORMAL
SHIGELLA DNA SPEC QL NAA+PROBE: NORMAL

## 2018-07-09 ENCOUNTER — TELEPHONE (OUTPATIENT)
Dept: FAMILY MEDICINE CLINIC | Facility: CLINIC | Age: 78
End: 2018-07-09

## 2018-07-09 LAB — O+P STL CONC: NORMAL

## 2018-07-09 NOTE — TELEPHONE ENCOUNTER
Please advise all stool testing was negative   She may want to consider speaking with Rheum, Dr Gerry Bosworth RA med as cause of loose stools

## 2018-07-09 NOTE — TELEPHONE ENCOUNTER
Annie:    Patient's daughter called asking for the results of patient's stool samples which was dropped off to you on Thursday  Please call back

## 2022-04-01 ENCOUNTER — TELEPHONE (OUTPATIENT)
Dept: FAMILY MEDICINE CLINIC | Facility: CLINIC | Age: 82
End: 2022-04-01

## 2022-04-01 NOTE — TELEPHONE ENCOUNTER
1st attempt- patient attribution- patient has not been seen in office since June 2018  Daughter informed patient is being seen as [facility] [address]- please remove Dr Alonzo Flowers as PCP

## 2022-06-19 NOTE — TELEPHONE ENCOUNTER
06/18/22 10:02 PM     Thank you for your request  Your request has been received, reviewed, and the patient chart updated; patient hasn't seen [facility] since 2018  The PCP has successfully been removed with a patient attribution note  This message will now be completed      Thank you  Benjamin Wilhelm